# Patient Record
Sex: MALE | Race: WHITE | NOT HISPANIC OR LATINO
[De-identification: names, ages, dates, MRNs, and addresses within clinical notes are randomized per-mention and may not be internally consistent; named-entity substitution may affect disease eponyms.]

---

## 2018-01-26 PROBLEM — Z00.00 ENCOUNTER FOR PREVENTIVE HEALTH EXAMINATION: Status: ACTIVE | Noted: 2018-01-26

## 2018-04-11 PROBLEM — Z00.00 ENCOUNTER FOR PREVENTIVE HEALTH EXAMINATION: Noted: 2018-04-11

## 2018-06-11 ENCOUNTER — APPOINTMENT (OUTPATIENT)
Dept: NEUROSURGERY | Facility: CLINIC | Age: 68
End: 2018-06-11
Payer: MEDICARE

## 2018-06-11 VITALS
SYSTOLIC BLOOD PRESSURE: 122 MMHG | WEIGHT: 190 LBS | TEMPERATURE: 97.9 F | OXYGEN SATURATION: 96 % | RESPIRATION RATE: 16 BRPM | DIASTOLIC BLOOD PRESSURE: 74 MMHG | HEART RATE: 55 BPM | BODY MASS INDEX: 22.43 KG/M2 | HEIGHT: 77 IN

## 2018-06-11 DIAGNOSIS — Z86.79 PERSONAL HISTORY OF OTHER DISEASES OF THE CIRCULATORY SYSTEM: ICD-10-CM

## 2018-06-11 DIAGNOSIS — S42.009A FRACTURE OF UNSPECIFIED PART OF UNSPECIFIED CLAVICLE, INITIAL ENCOUNTER FOR CLOSED FRACTURE: ICD-10-CM

## 2018-06-11 DIAGNOSIS — M19.90 UNSPECIFIED OSTEOARTHRITIS, UNSPECIFIED SITE: ICD-10-CM

## 2018-06-11 DIAGNOSIS — Z87.39 PERSONAL HISTORY OF OTHER DISEASES OF THE MUSCULOSKELETAL SYSTEM AND CONNECTIVE TISSUE: ICD-10-CM

## 2018-06-11 DIAGNOSIS — G47.30 SLEEP APNEA, UNSPECIFIED: ICD-10-CM

## 2018-06-11 DIAGNOSIS — Z86.69 PERSONAL HISTORY OF OTHER DISEASES OF THE NERVOUS SYSTEM AND SENSE ORGANS: ICD-10-CM

## 2018-06-11 DIAGNOSIS — Z86.59 PERSONAL HISTORY OF OTHER MENTAL AND BEHAVIORAL DISORDERS: ICD-10-CM

## 2018-06-11 DIAGNOSIS — F32.9 MAJOR DEPRESSIVE DISORDER, SINGLE EPISODE, UNSPECIFIED: ICD-10-CM

## 2018-06-11 DIAGNOSIS — Z78.9 OTHER SPECIFIED HEALTH STATUS: ICD-10-CM

## 2018-06-11 DIAGNOSIS — Z87.01 PERSONAL HISTORY OF PNEUMONIA (RECURRENT): ICD-10-CM

## 2018-06-11 PROCEDURE — 99205 OFFICE O/P NEW HI 60 MIN: CPT

## 2018-06-12 PROBLEM — Z86.79 HISTORY OF HYPERTENSION: Status: RESOLVED | Noted: 2018-06-11 | Resolved: 2018-06-12

## 2018-06-12 PROBLEM — Z87.39 HISTORY OF ARTHRITIS: Status: RESOLVED | Noted: 2018-06-11 | Resolved: 2018-06-12

## 2018-06-12 PROBLEM — S42.009A COLLAR BONE FRACTURE: Status: ACTIVE | Noted: 2018-06-12

## 2018-06-12 PROBLEM — F32.9 DEPRESSION: Status: ACTIVE | Noted: 2018-06-12

## 2018-06-12 PROBLEM — Z86.59 HISTORY OF DEPRESSION: Status: RESOLVED | Noted: 2018-06-11 | Resolved: 2018-06-12

## 2018-06-12 PROBLEM — G47.30 SLEEP APNEA: Status: ACTIVE | Noted: 2018-06-12

## 2018-06-12 PROBLEM — M19.90 ARTHRITIS: Status: ACTIVE | Noted: 2018-06-12

## 2018-06-12 PROBLEM — Z86.69 HISTORY OF SLEEP APNEA: Status: RESOLVED | Noted: 2018-06-11 | Resolved: 2018-06-12

## 2018-06-12 PROBLEM — Z78.9 DOES NOT USE ILLICIT DRUGS: Status: ACTIVE | Noted: 2018-06-11

## 2018-06-12 PROBLEM — Z87.01 HISTORY OF PNEUMONIA: Status: RESOLVED | Noted: 2018-06-11 | Resolved: 2018-06-12

## 2018-06-12 RX ORDER — QUETIAPINE FUMARATE 25 MG/1
25 TABLET ORAL
Refills: 0 | Status: ACTIVE | COMMUNITY

## 2018-06-12 RX ORDER — MONTELUKAST 10 MG/1
10 TABLET, FILM COATED ORAL
Refills: 0 | Status: ACTIVE | COMMUNITY

## 2018-06-18 ENCOUNTER — APPOINTMENT (OUTPATIENT)
Dept: OPHTHALMOLOGY | Facility: CLINIC | Age: 68
End: 2018-06-18
Payer: COMMERCIAL

## 2018-06-18 PROCEDURE — 92225: CPT | Mod: RT

## 2018-06-18 PROCEDURE — 92004 COMPRE OPH EXAM NEW PT 1/>: CPT

## 2018-07-13 ENCOUNTER — APPOINTMENT (OUTPATIENT)
Dept: PULMONOLOGY | Facility: CLINIC | Age: 68
End: 2018-07-13
Payer: MEDICARE

## 2018-07-13 VITALS
RESPIRATION RATE: 16 BRPM | WEIGHT: 186 LBS | OXYGEN SATURATION: 97 % | TEMPERATURE: 98 F | DIASTOLIC BLOOD PRESSURE: 78 MMHG | SYSTOLIC BLOOD PRESSURE: 110 MMHG | HEIGHT: 77 IN | HEART RATE: 90 BPM | BODY MASS INDEX: 21.96 KG/M2

## 2018-07-13 PROCEDURE — 99204 OFFICE O/P NEW MOD 45 MIN: CPT

## 2018-07-19 ENCOUNTER — APPOINTMENT (OUTPATIENT)
Dept: OPHTHALMOLOGY | Facility: CLINIC | Age: 68
End: 2018-07-19
Payer: MEDICARE

## 2018-07-19 PROCEDURE — ZZZZZ: CPT

## 2018-07-19 PROCEDURE — 66821 AFTER CATARACT LASER SURGERY: CPT | Mod: LT

## 2018-08-08 ENCOUNTER — APPOINTMENT (OUTPATIENT)
Dept: ORTHOPEDIC SURGERY | Facility: CLINIC | Age: 68
End: 2018-08-08

## 2018-08-13 ENCOUNTER — APPOINTMENT (OUTPATIENT)
Dept: NEUROLOGY | Facility: CLINIC | Age: 68
End: 2018-08-13
Payer: MEDICARE

## 2018-08-13 VITALS
BODY MASS INDEX: 21.96 KG/M2 | HEART RATE: 95 BPM | OXYGEN SATURATION: 98 % | HEIGHT: 77 IN | TEMPERATURE: 98.2 F | WEIGHT: 186 LBS | DIASTOLIC BLOOD PRESSURE: 78 MMHG | SYSTOLIC BLOOD PRESSURE: 118 MMHG

## 2018-08-13 DIAGNOSIS — Z82.0 FAMILY HISTORY OF EPILEPSY AND OTHER DISEASES OF THE NERVOUS SYSTEM: ICD-10-CM

## 2018-08-13 DIAGNOSIS — Z87.891 PERSONAL HISTORY OF NICOTINE DEPENDENCE: ICD-10-CM

## 2018-08-13 DIAGNOSIS — M75.82 OTHER SHOULDER LESIONS, LEFT SHOULDER: ICD-10-CM

## 2018-08-13 PROCEDURE — 99204 OFFICE O/P NEW MOD 45 MIN: CPT

## 2018-08-20 ENCOUNTER — APPOINTMENT (OUTPATIENT)
Dept: PULMONOLOGY | Facility: CLINIC | Age: 68
End: 2018-08-20
Payer: MEDICARE

## 2018-08-20 VITALS
TEMPERATURE: 97.8 F | WEIGHT: 196 LBS | HEART RATE: 78 BPM | OXYGEN SATURATION: 97 % | BODY MASS INDEX: 23.24 KG/M2 | DIASTOLIC BLOOD PRESSURE: 78 MMHG | SYSTOLIC BLOOD PRESSURE: 114 MMHG

## 2018-08-20 PROCEDURE — 99214 OFFICE O/P EST MOD 30 MIN: CPT

## 2018-09-12 ENCOUNTER — APPOINTMENT (OUTPATIENT)
Dept: NEUROLOGY | Facility: CLINIC | Age: 68
End: 2018-09-12

## 2018-10-12 ENCOUNTER — APPOINTMENT (OUTPATIENT)
Dept: NEUROLOGY | Facility: CLINIC | Age: 68
End: 2018-10-12
Payer: MEDICARE

## 2018-10-12 VITALS — OXYGEN SATURATION: 97 % | DIASTOLIC BLOOD PRESSURE: 65 MMHG | HEART RATE: 79 BPM | SYSTOLIC BLOOD PRESSURE: 92 MMHG

## 2018-10-12 DIAGNOSIS — Z87.820 PERSONAL HISTORY OF TRAUMATIC BRAIN INJURY: ICD-10-CM

## 2018-10-12 PROCEDURE — 99213 OFFICE O/P EST LOW 20 MIN: CPT

## 2018-10-29 ENCOUNTER — APPOINTMENT (OUTPATIENT)
Dept: PULMONOLOGY | Facility: CLINIC | Age: 68
End: 2018-10-29
Payer: MEDICARE

## 2018-10-29 VITALS — HEART RATE: 71 BPM | RESPIRATION RATE: 18 BRPM | OXYGEN SATURATION: 94 %

## 2018-10-29 VITALS
HEART RATE: 121 BPM | RESPIRATION RATE: 12 BRPM | DIASTOLIC BLOOD PRESSURE: 70 MMHG | OXYGEN SATURATION: 91 % | SYSTOLIC BLOOD PRESSURE: 112 MMHG | TEMPERATURE: 97.9 F

## 2018-10-29 PROCEDURE — 99214 OFFICE O/P EST MOD 30 MIN: CPT

## 2018-11-16 ENCOUNTER — RX RENEWAL (OUTPATIENT)
Age: 68
End: 2018-11-16

## 2019-01-04 ENCOUNTER — APPOINTMENT (OUTPATIENT)
Dept: PULMONOLOGY | Facility: CLINIC | Age: 69
End: 2019-01-04
Payer: MEDICARE

## 2019-01-04 VITALS
RESPIRATION RATE: 14 BRPM | OXYGEN SATURATION: 98 % | WEIGHT: 196 LBS | HEART RATE: 76 BPM | BODY MASS INDEX: 23.24 KG/M2 | DIASTOLIC BLOOD PRESSURE: 80 MMHG | SYSTOLIC BLOOD PRESSURE: 112 MMHG

## 2019-01-04 PROCEDURE — 99215 OFFICE O/P EST HI 40 MIN: CPT | Mod: 25

## 2019-01-04 PROCEDURE — 94060 EVALUATION OF WHEEZING: CPT

## 2019-01-04 RX ORDER — VALPROIC ACID 250 MG/1
250 CAPSULE, LIQUID FILLED ORAL
Refills: 0 | Status: COMPLETED | COMMUNITY
End: 2019-01-04

## 2019-01-04 RX ORDER — APIXABAN 5 MG/1
5 TABLET, FILM COATED ORAL
Refills: 0 | Status: ACTIVE | COMMUNITY

## 2019-03-01 ENCOUNTER — APPOINTMENT (OUTPATIENT)
Dept: NEUROLOGY | Facility: CLINIC | Age: 69
End: 2019-03-01
Payer: MEDICARE

## 2019-03-01 VITALS
SYSTOLIC BLOOD PRESSURE: 104 MMHG | HEART RATE: 73 BPM | BODY MASS INDEX: 22.91 KG/M2 | HEIGHT: 77 IN | DIASTOLIC BLOOD PRESSURE: 66 MMHG | WEIGHT: 194 LBS | OXYGEN SATURATION: 97 %

## 2019-03-01 DIAGNOSIS — R41.89 OTHER SYMPTOMS AND SIGNS INVOLVING COGNITIVE FUNCTIONS AND AWARENESS: ICD-10-CM

## 2019-03-01 DIAGNOSIS — R46.89 OTHER SYMPTOMS AND SIGNS INVOLVING COGNITIVE FUNCTIONS AND AWARENESS: ICD-10-CM

## 2019-03-01 PROCEDURE — 99213 OFFICE O/P EST LOW 20 MIN: CPT

## 2019-03-19 ENCOUNTER — RX RENEWAL (OUTPATIENT)
Age: 69
End: 2019-03-19

## 2019-05-01 ENCOUNTER — APPOINTMENT (OUTPATIENT)
Dept: PULMONOLOGY | Facility: CLINIC | Age: 69
End: 2019-05-01
Payer: MEDICARE

## 2019-05-01 VITALS
OXYGEN SATURATION: 97 % | DIASTOLIC BLOOD PRESSURE: 70 MMHG | SYSTOLIC BLOOD PRESSURE: 100 MMHG | HEART RATE: 91 BPM | BODY MASS INDEX: 23.36 KG/M2 | WEIGHT: 197 LBS

## 2019-05-01 PROCEDURE — 99214 OFFICE O/P EST MOD 30 MIN: CPT

## 2019-05-01 RX ORDER — ASPIRIN ENTERIC COATED TABLETS 81 MG 81 MG/1
81 TABLET, DELAYED RELEASE ORAL
Refills: 0 | Status: COMPLETED | COMMUNITY
End: 2019-05-01

## 2019-05-01 NOTE — DISCUSSION/SUMMARY
[FreeTextEntry1] : He is a 68 year-old man with a history of mild COPD. He had a left thoracotomy in the remote past. It is not clear specifically what procedure was done, possibly a pleurodesis after a pneumothorax. He also has a history of traumatic brain injury. He stopped smoking over 30 years ago. \par \par His COPD he is not active at the present time. He was advised to use a nebulizer as needed. He is unable to use a metered-dose inhaler. \par \par I will see him again in 6 months. Sooner if necessary.

## 2019-05-01 NOTE — PROCEDURE
[FreeTextEntry1] : Spirometry was performed January 4, 2019. No obstructive airways disease was present. No significant change after inhalation of a bronchodilator.\par \par A chest radiograph was performed on November 3, 2018. A retrocardiac opacity was present. Possibly due to a small left pleural effusion. Of note is that he is status post a left thoracotomy in the remote past.\par \par

## 2019-05-01 NOTE — HISTORY OF PRESENT ILLNESS
[FreeTextEntry1] : He is feeling well. On Robitussin as needed for a cough. Uses nebulizer one to two times per day. No c/o wheezing shortness of breath.

## 2019-05-01 NOTE — PHYSICAL EXAM
[General Appearance - In No Acute Distress] : no acute distress [Neck Appearance] : the appearance of the neck was normal [Heart Sounds] : normal S1 and S2 [Neck Cervical Mass (___cm)] : no neck mass was observed [Auscultation Breath Sounds / Voice Sounds] : lungs were clear to auscultation bilaterally [Bowel Sounds] : normal bowel sounds [Nail Clubbing] : no clubbing of the fingernails [Abdomen Soft] : soft [Cyanosis, Localized] : no localized cyanosis [] : no rash [No Focal Deficits] : no focal deficits [Oriented To Time, Place, And Person] : oriented to person, place, and time [Erythema] : no erythema of the pharynx [FreeTextEntry1] : Left thoracotomy scar.

## 2019-05-01 NOTE — REVIEW OF SYSTEMS
[Hypertension] : ~T hypertension [Trauma] : ~T physical trauma [Seizures] : seizures [Memory Loss] : ~T memory lapses or loss [Fever] : no fever [Nasal Congestion] : no nasal congestion [Sinus Problems] : no sinus problems [Cough] : cough [Postnasal Drip] : no postnasal drip [Sputum] : not coughing up ~M sputum [Hemoptysis] : no hemoptysis [Dyspnea] : no dyspnea [Wheezing] : no wheezing [Pleuritic Pain] : no pleuritic pain [Chest Tightness] : no chest tightness [Nasal Discharge] : no nasal discharge [Chest Discomfort] : no chest discomfort [Heartburn] : no heartburn [Reflux] : no reflux [Nocturia] : no nocturia [Back Pain] : ~T no back pain [Rash] : no [unfilled] rash [Anemia] : no anemia [Headache] : no headache [Depression] : no depression [Diabetes] : no diabetes mellitus [Difficulty Initiating Sleep] : no difficulty falling asleep [DVT] : no DVT [Snoring] : no snoring

## 2019-07-12 ENCOUNTER — APPOINTMENT (OUTPATIENT)
Dept: PULMONOLOGY | Facility: CLINIC | Age: 69
End: 2019-07-12
Payer: MEDICARE

## 2019-07-12 VITALS
WEIGHT: 206 LBS | SYSTOLIC BLOOD PRESSURE: 110 MMHG | HEART RATE: 85 BPM | OXYGEN SATURATION: 96 % | HEIGHT: 72 IN | BODY MASS INDEX: 27.9 KG/M2 | DIASTOLIC BLOOD PRESSURE: 69 MMHG | RESPIRATION RATE: 14 BRPM

## 2019-07-12 DIAGNOSIS — J44.9 CHRONIC OBSTRUCTIVE PULMONARY DISEASE, UNSPECIFIED: ICD-10-CM

## 2019-07-12 PROCEDURE — 99214 OFFICE O/P EST MOD 30 MIN: CPT

## 2019-07-12 NOTE — DISCUSSION/SUMMARY
[FreeTextEntry1] : He is a 68 year-old man with a history of mild COPD. He had a left thoracotomy in the remote past. It is not clear specifically what procedure was done. It appears he had a pleurodesis after a pneumothorax. He also has a history of traumatic brain injury. He stopped smoking over 30 years ago. \par \par Has been coughing more lately. For about one month. Has trouble in producing mucus. He was given a trial of budesonide. To add this to albuterol twice a day. \par \par Follow up in three months. Sooner if needed.

## 2019-07-12 NOTE — PHYSICAL EXAM
[General Appearance - In No Acute Distress] : no acute distress [Neck Cervical Mass (___cm)] : no neck mass was observed [Neck Appearance] : the appearance of the neck was normal [Auscultation Breath Sounds / Voice Sounds] : lungs were clear to auscultation bilaterally [Heart Sounds] : normal S1 and S2 [Bowel Sounds] : normal bowel sounds [Nail Clubbing] : no clubbing of the fingernails [Cyanosis, Localized] : no localized cyanosis [No Focal Deficits] : no focal deficits [Oriented To Time, Place, And Person] : oriented to person, place, and time [Impaired Insight] : insight and judgment were intact [] : no hepato-splenomegaly [Erythema] : no erythema of the pharynx [FreeTextEntry1] : Left thoracotomy scar.

## 2019-07-12 NOTE — HISTORY OF PRESENT ILLNESS
[FreeTextEntry1] : He said he has been coughing more. He has been feeling congested. Some mild difficulty in producing mucus. No wheezing or shortness of breath.No fever, chills or sweats. No chest pain. He has been more active. He has been taking dancing lessons.

## 2019-07-12 NOTE — REVIEW OF SYSTEMS
[Cough] : cough [Hypertension] : ~T hypertension [Trauma] : ~T physical trauma [Memory Loss] : ~T memory lapses or loss [Seizures] : seizures [Fever] : no fever [Fatigue] : no fatigue [Nasal Congestion] : no nasal congestion [Postnasal Drip] : no postnasal drip [Sinus Problems] : no sinus problems [Sputum] : not coughing up ~M sputum [Hemoptysis] : no hemoptysis [Dyspnea] : no dyspnea [Wheezing] : no wheezing [Chest Discomfort] : no chest discomfort [Nasal Discharge] : no nasal discharge [Heartburn] : no heartburn [Reflux] : no reflux [Nocturia] : no nocturia [Back Pain] : ~T no back pain [Rash] : no [unfilled] rash [Headache] : no headache [Anemia] : no anemia [Depression] : no depression [Diabetes] : no diabetes mellitus [DVT] : no DVT [Difficulty Initiating Sleep] : no difficulty falling asleep [Snoring] : no snoring

## 2019-08-13 ENCOUNTER — APPOINTMENT (OUTPATIENT)
Dept: NEUROLOGY | Facility: CLINIC | Age: 69
End: 2019-08-13

## 2019-10-10 NOTE — HISTORY OF PRESENT ILLNESS
[de-identified] : KWESI DOVER is a 68 year old male who present in the office for initial consultation with a CC of "_". Patient was referred by MD___\travis B/L breast pain

## 2019-10-16 ENCOUNTER — APPOINTMENT (OUTPATIENT)
Dept: SURGERY | Facility: CLINIC | Age: 69
End: 2019-10-16

## 2019-10-21 ENCOUNTER — APPOINTMENT (OUTPATIENT)
Dept: SURGERY | Facility: CLINIC | Age: 69
End: 2019-10-21

## 2019-11-22 ENCOUNTER — APPOINTMENT (OUTPATIENT)
Dept: PULMONOLOGY | Facility: CLINIC | Age: 69
End: 2019-11-22
Payer: MEDICARE

## 2019-11-22 VITALS
HEART RATE: 79 BPM | DIASTOLIC BLOOD PRESSURE: 70 MMHG | RESPIRATION RATE: 16 BRPM | OXYGEN SATURATION: 97 % | SYSTOLIC BLOOD PRESSURE: 102 MMHG

## 2019-11-22 PROCEDURE — 99214 OFFICE O/P EST MOD 30 MIN: CPT

## 2019-11-22 NOTE — PHYSICAL EXAM
[General Appearance - In No Acute Distress] : no acute distress [Neck Appearance] : the appearance of the neck was normal [Neck Cervical Mass (___cm)] : no neck mass was observed [Heart Sounds] : normal S1 and S2 [Auscultation Breath Sounds / Voice Sounds] : lungs were clear to auscultation bilaterally [Bowel Sounds] : normal bowel sounds [Cyanosis, Localized] : no localized cyanosis [] : no rash [No Focal Deficits] : no focal deficits [Oriented To Time, Place, And Person] : oriented to person, place, and time [Impaired Insight] : insight and judgment were intact [General Appearance - Well Developed] : well developed [Erythema] : no erythema of the pharynx [FreeTextEntry1] : Left thoracotomy scar.

## 2019-11-22 NOTE — HISTORY OF PRESENT ILLNESS
[FreeTextEntry1] : He has been feeling more short of breath at times. Has been using the nebulizer twice a day. No fever or chills. No chest pain.

## 2019-11-22 NOTE — REVIEW OF SYSTEMS
[Cough] : cough [Hypertension] : ~T hypertension [Trauma] : ~T physical trauma [Seizures] : seizures [Memory Loss] : ~T memory lapses or loss [Fever] : no fever [Fatigue] : no fatigue [Nasal Congestion] : no nasal congestion [Postnasal Drip] : no postnasal drip [Sinus Problems] : no sinus problems [Sputum] : not coughing up ~M sputum [Dyspnea] : no dyspnea [Wheezing] : no wheezing [Chest Discomfort] : no chest discomfort [Nasal Discharge] : no nasal discharge [Heartburn] : no heartburn [Reflux] : no reflux [Nocturia] : no nocturia [Back Pain] : ~T no back pain [Rash] : no [unfilled] rash [Anemia] : no anemia [Headache] : no headache [Depression] : no depression [Diabetes] : no diabetes mellitus [DVT] : no DVT [Difficulty Initiating Sleep] : no difficulty falling asleep [Snoring] : no snoring

## 2019-11-22 NOTE — DISCUSSION/SUMMARY
[FreeTextEntry1] : He is a 69 year-old man with a history of mild COPD. He had a left thoracotomy in the remote past. It is not clear specifically what procedure was done. It appears he had a pleurodesis after a pneumothorax. He also has a history of traumatic brain injury. He stopped smoking over 30 years ago. \par \par He has been coughing more lately he said. Has difficulty in producing mucus. He was advised to use his nebulizer regularly. This should help produce mucus.\par \par A followup chest radiograph was requested.\par \par Follow up in six months.

## 2022-05-06 ENCOUNTER — APPOINTMENT (OUTPATIENT)
Dept: ELECTROPHYSIOLOGY | Facility: CLINIC | Age: 72
End: 2022-05-06
Payer: MEDICARE

## 2022-05-06 VITALS
SYSTOLIC BLOOD PRESSURE: 108 MMHG | OXYGEN SATURATION: 95 % | DIASTOLIC BLOOD PRESSURE: 74 MMHG | HEART RATE: 83 BPM | TEMPERATURE: 97.9 F | BODY MASS INDEX: 30.79 KG/M2 | HEIGHT: 72 IN

## 2022-05-06 VITALS — WEIGHT: 227 LBS | BODY MASS INDEX: 30.79 KG/M2

## 2022-05-06 DIAGNOSIS — E78.5 HYPERLIPIDEMIA, UNSPECIFIED: ICD-10-CM

## 2022-05-06 DIAGNOSIS — I48.19 OTHER PERSISTENT ATRIAL FIBRILLATION: ICD-10-CM

## 2022-05-06 DIAGNOSIS — I10 ESSENTIAL (PRIMARY) HYPERTENSION: ICD-10-CM

## 2022-05-06 PROCEDURE — 99204 OFFICE O/P NEW MOD 45 MIN: CPT

## 2022-05-06 PROCEDURE — 93000 ELECTROCARDIOGRAM COMPLETE: CPT

## 2022-05-06 RX ORDER — SACUBITRIL AND VALSARTAN 49; 51 MG/1; MG/1
49-51 TABLET, FILM COATED ORAL TWICE DAILY
Refills: 0 | Status: ACTIVE | COMMUNITY
Start: 2022-05-06

## 2022-05-06 RX ORDER — METOPROLOL SUCCINATE 200 MG/1
200 TABLET, EXTENDED RELEASE ORAL DAILY
Qty: 45 | Refills: 3 | Status: ACTIVE | COMMUNITY
Start: 1900-01-01 | End: 1900-01-01

## 2022-05-06 RX ORDER — LOSARTAN POTASSIUM 50 MG/1
50 TABLET, FILM COATED ORAL
Refills: 0 | Status: DISCONTINUED | COMMUNITY
End: 2022-05-06

## 2022-05-06 RX ORDER — FUROSEMIDE 40 MG/1
40 TABLET ORAL
Refills: 0 | Status: DISCONTINUED | COMMUNITY
End: 2022-05-06

## 2022-05-06 RX ORDER — ATORVASTATIN CALCIUM 40 MG/1
40 TABLET, FILM COATED ORAL DAILY
Refills: 0 | Status: ACTIVE | COMMUNITY
Start: 2022-05-06

## 2022-05-06 NOTE — HISTORY OF PRESENT ILLNESS
[FreeTextEntry1] : Manuel Russell is a 71 year old male with PMH of [place], persistent AFIB (2018), NICM, COPD s/p left lung lobectomy in 1990s; presenting today for initial evaluation. Recently saw Cardiologist on 4/25/22 for palpitations, lightheadedness and dizziness with exertion. He underwent 4 day holter monitor revealing AFIB. His most recent stress test reveals an EF of 39%. Admits to chest pressure (7/10) up to 5 times per week but believes it to be related to his COPD diagnosis. On Eliquis for thromboembolic prophylaxis with no bleeding issues.

## 2022-05-06 NOTE — DISCUSSION/SUMMARY
[FreeTextEntry1] : Impression: \par \par 1. Afib: EKG performed in office today to assess for AFIB and reveals AFIB. Discussed treatment options for afib including rate control vs antiarrhythmics vs DCCV vs possible ablation. Given recurrent symptomatic afib despite rate control management and young age/preference to avoid antiarrhythmics, recommend undergoing possible afib ablation. Encouraged avoiding AFIB triggers such as alcohol, caffeine, dehydration, and stress. Recommend increasing Metoprolol to 300mg daily, if still symptomatic may consider increasing to 400mg in future. If still symptomatic with 400mg Metoprolol, will consider adding Digoxin to medication regimen. \par \par 2. HTN: Resume oral antihypertensives as prescribed. Encouraged heart healthy diet, sodium restriction, and weight loss. Continue regular follow ups with Cardiologist for further HTN management.\par \par 3. HLD: Resume statin therapy as prescribed and regular follow up with Cardiologist for routine lipid monitoring and management.\par \par Plan for metoprolol and if necessary digoxin. \par \par Sincerely,\par \par Primitivo Snow MD

## 2023-07-25 ENCOUNTER — APPOINTMENT (OUTPATIENT)
Dept: PULMONOLOGY | Facility: CLINIC | Age: 73
End: 2023-07-25
Payer: MEDICARE

## 2023-07-25 VITALS
TEMPERATURE: 97.7 F | OXYGEN SATURATION: 97 % | WEIGHT: 218 LBS | SYSTOLIC BLOOD PRESSURE: 110 MMHG | DIASTOLIC BLOOD PRESSURE: 79 MMHG | HEIGHT: 72 IN | BODY MASS INDEX: 29.53 KG/M2 | HEART RATE: 89 BPM

## 2023-07-25 PROCEDURE — 99204 OFFICE O/P NEW MOD 45 MIN: CPT

## 2023-07-27 NOTE — PROCEDURE
[FreeTextEntry1] : previous data reviewed:\par \par [facility] xray chest 2017- hyperinflated, hiatal hernia\par [facility] ct a/p 2023- GGO at lung bases/ atelctasis lingula; large diaphgramtic hernia\par [facility] xray 2018- hyperinflated lung; retrocardiac opacity\par

## 2023-07-27 NOTE — HISTORY OF PRESENT ILLNESS
[< 20 pack-years] : < 20 pack-years [TextBox_4] : KWESI DOVER is a 72 year old male who presents with his wife for copd/ cough eval\par \par 73 yo male , former smoker, quit > 30 years ago, history of TBI/ cogniitive impairment,  [facility] History, afib on NOAC\par poor historian\par some history from wife\par \par some coughing wheezing\par some use of nebulizer\par no obvious coughing with food/drinks\par \par  [YearQuit] : 1990

## 2023-09-20 ENCOUNTER — APPOINTMENT (OUTPATIENT)
Dept: PULMONOLOGY | Facility: CLINIC | Age: 73
End: 2023-09-20
Payer: MEDICARE

## 2023-09-20 VITALS — DIASTOLIC BLOOD PRESSURE: 64 MMHG | SYSTOLIC BLOOD PRESSURE: 92 MMHG

## 2023-09-20 VITALS — OXYGEN SATURATION: 98 % | HEART RATE: 88 BPM | RESPIRATION RATE: 16 BRPM

## 2023-09-20 DIAGNOSIS — R05.9 COUGH, UNSPECIFIED: ICD-10-CM

## 2023-09-20 PROCEDURE — 99214 OFFICE O/P EST MOD 30 MIN: CPT

## 2024-04-25 ENCOUNTER — APPOINTMENT (OUTPATIENT)
Dept: PULMONOLOGY | Facility: CLINIC | Age: 74
End: 2024-04-25
Payer: MEDICARE

## 2024-04-25 VITALS
HEART RATE: 84 BPM | OXYGEN SATURATION: 97 % | BODY MASS INDEX: 29.66 KG/M2 | DIASTOLIC BLOOD PRESSURE: 66 MMHG | WEIGHT: 219 LBS | TEMPERATURE: 97.7 F | HEIGHT: 72 IN | SYSTOLIC BLOOD PRESSURE: 111 MMHG

## 2024-04-25 DIAGNOSIS — R93.89 ABNORMAL FINDINGS ON DIAGNOSTIC IMAGING OF OTHER SPECIFIED BODY STRUCTURES: ICD-10-CM

## 2024-04-25 PROCEDURE — 99214 OFFICE O/P EST MOD 30 MIN: CPT

## 2024-04-25 NOTE — PHYSICAL EXAM
[No Acute Distress] : no acute distress [No Resp Distress] : no resp distress [No Acc Muscle Use] : no acc muscle use [Clear to Auscultation Bilaterally] : clear to auscultation bilaterally [Kyphosis] : kyphosis [TextBox_132] : ambulates with cane  [TextBox_140] : orientated to person/place; poor historian

## 2024-04-25 NOTE — PROCEDURE
[FreeTextEntry1] : [facility] xray 8/2023 reviewed  previous data reviewed:  [facility] xray chest 2017- hyperinflated, hiatal hernia [facility] ct a/p 2023- GGO at lung bases/ atelectasis lingula; large diaphragmatic hernia [facility] xray 2018- hyperinflated lung; retrocardiac opacity

## 2024-04-25 NOTE — HISTORY OF PRESENT ILLNESS
[Former] : former [TextBox_4] : KWESI DOVER is a 72 year old male who presents with his wife for copd/ cough f/u  former smoker, quit > 30 years ago, history of TBI/ cogniitive impairment, [facility] History, afib on NOAC poor historian some history from wife- states he only takes albuterol nebs  had xray done at [facility] and CT chest in 2023    coughign better on neb + acetylcysteine nebs   .   Smoking Status: < 20 pack-years   Year quit: 1990

## 2024-06-17 ENCOUNTER — APPOINTMENT (OUTPATIENT)
Dept: PULMONOLOGY | Facility: CLINIC | Age: 74
End: 2024-06-17
Payer: MEDICARE

## 2024-06-17 VITALS
SYSTOLIC BLOOD PRESSURE: 98 MMHG | TEMPERATURE: 97.3 F | OXYGEN SATURATION: 94 % | DIASTOLIC BLOOD PRESSURE: 68 MMHG | WEIGHT: 220 LBS | BODY MASS INDEX: 29.84 KG/M2 | HEART RATE: 97 BPM

## 2024-06-17 DIAGNOSIS — J44.9 CHRONIC OBSTRUCTIVE PULMONARY DISEASE, UNSPECIFIED: ICD-10-CM

## 2024-06-17 PROCEDURE — 99214 OFFICE O/P EST MOD 30 MIN: CPT

## 2024-06-17 RX ORDER — BUDESONIDE 0.25 MG/2ML
0.25 INHALANT ORAL TWICE DAILY
Qty: 6 | Refills: 2 | Status: COMPLETED | COMMUNITY
Start: 2019-07-12 | End: 2024-06-17

## 2024-06-17 NOTE — HISTORY OF PRESENT ILLNESS
[Former] : former [TextBox_4] : He remains on the nebulizer.  He uses albuterol and acetylcysteine twice a day.  He denied any cough, wheezing or shortness of breath.  No chest pain.  No constitutional symptoms. [YearQuit] : 1990

## 2024-06-17 NOTE — REVIEW OF SYSTEMS
[Hypertension] : ~T hypertension [Trauma] : ~T physical trauma [Seizures] : seizures [Memory Loss] : ~T memory lapses or loss [Fever] : no fever [Chills] : no chills [Nasal Congestion] : no nasal congestion [Cough] : no cough [Sputum] : not coughing up ~M sputum [Hemoptysis] : no hemoptysis [Dyspnea] : no dyspnea [Chest Tightness] : no chest tightness [Wheezing] : no wheezing [Chest Discomfort] : no chest discomfort [Nasal Discharge] : no nasal discharge [Heartburn] : no heartburn [Reflux] : no reflux [Nocturia] : no nocturia [Back Pain] : ~T no back pain [Rash] : no [unfilled] rash [Anemia] : no anemia [Headache] : no headache [Depression] : no depression [Diabetes] : no diabetes mellitus [DVT] : no DVT [Difficulty Initiating Sleep] : no difficulty falling asleep [Snoring] : no snoring

## 2024-06-17 NOTE — PHYSICAL EXAM
[General Appearance - In No Acute Distress] : no acute distress [General Appearance - Well Developed] : well developed [Neck Appearance] : the appearance of the neck was normal [Neck Cervical Mass (___cm)] : no neck mass was observed [Heart Rate And Rhythm] : heart rate and rhythm were normal [Heart Sounds] : normal S1 and S2 [Auscultation Breath Sounds / Voice Sounds] : lungs were clear to auscultation bilaterally [Bowel Sounds] : normal bowel sounds [Nail Clubbing] : no clubbing of the fingernails [Cyanosis, Localized] : no localized cyanosis [Skin Turgor] : normal skin turgor [] : no rash [No Focal Deficits] : no focal deficits [Oriented To Time, Place, And Person] : oriented to person, place, and time [Impaired Insight] : insight and judgment were intact [FreeTextEntry1] : Left thoracotomy scar.

## 2024-06-17 NOTE — PROCEDURE
[FreeTextEntry1] : PFT 1/4/19: No obstructive airways disease was present. No significant change after inhalation of a bronchodilator.  Chest x-ray 11/3/18: A retrocardiac opacity was present. Possibly due to a small left pleural effusion. Of note is that he is status post a left thoracotomy in the remote past.

## 2024-06-17 NOTE — DISCUSSION/SUMMARY
[FreeTextEntry1] : Impression COPD Former smoker Chronic cough with mucus production Status post left thoracotomy TBI  Recommend Continue with albuterol and acetylcysteine via nebulizer Obtain recent chest radiograph reports -Apparently he had recent films done Follow-up in 6 months, sooner if needed

## 2024-09-30 ENCOUNTER — OFFICE (OUTPATIENT)
Dept: URBAN - METROPOLITAN AREA CLINIC 109 | Facility: CLINIC | Age: 74
Setting detail: OPHTHALMOLOGY
End: 2024-09-30
Payer: MEDICARE

## 2024-09-30 DIAGNOSIS — H40.1132: ICD-10-CM

## 2024-09-30 DIAGNOSIS — H52.4: ICD-10-CM

## 2024-09-30 PROCEDURE — 92133 CPTRZD OPH DX IMG PST SGM ON: CPT | Performed by: OPHTHALMOLOGY

## 2024-09-30 PROCEDURE — 99204 OFFICE O/P NEW MOD 45 MIN: CPT | Performed by: OPHTHALMOLOGY

## 2024-09-30 PROCEDURE — 92015 DETERMINE REFRACTIVE STATE: CPT | Performed by: OPHTHALMOLOGY

## 2024-09-30 PROCEDURE — 92083 EXTENDED VISUAL FIELD XM: CPT | Performed by: OPHTHALMOLOGY

## 2024-09-30 PROCEDURE — 92020 GONIOSCOPY: CPT | Performed by: OPHTHALMOLOGY

## 2024-09-30 ASSESSMENT — CONFRONTATIONAL VISUAL FIELD TEST (CVF)
OD_FINDINGS: FULL
OS_FINDINGS: FULL

## 2024-12-16 ENCOUNTER — APPOINTMENT (OUTPATIENT)
Dept: PULMONOLOGY | Facility: CLINIC | Age: 74
End: 2024-12-16

## 2025-01-06 ENCOUNTER — OFFICE (OUTPATIENT)
Dept: URBAN - METROPOLITAN AREA CLINIC 109 | Facility: CLINIC | Age: 75
Setting detail: OPHTHALMOLOGY
End: 2025-01-06
Payer: MEDICARE

## 2025-01-06 DIAGNOSIS — H40.1132: ICD-10-CM

## 2025-01-06 PROCEDURE — 99213 OFFICE O/P EST LOW 20 MIN: CPT | Performed by: OPHTHALMOLOGY

## 2025-01-06 ASSESSMENT — REFRACTION_MANIFEST
OS_CYLINDER: -0.75
OS_CYLINDER: -1.00
OS_SPHERE: +2.25
OD_AXIS: 115
OD_CYLINDER: -1.25
OD_SPHERE: +2.75
OS_AXIS: 65
OD_VA1: 20/20
OS_VA1: 20/20
OD_ADD: +2.50
OS_AXIS: 024
OS_SPHERE: -0.50
OD_SPHERE: -1.00
OS_ADD: +2.50
OD_CYLINDER: SPH
OD_AXIS: 0

## 2025-01-06 ASSESSMENT — KERATOMETRY
OD_AXISANGLE_DEGREES: 070
OS_AXISANGLE_DEGREES: 103
OD_K2POWER_DIOPTERS: 46.25
OS_K2POWER_DIOPTERS: 46.50
OS_K1POWER_DIOPTERS: 45.00
OD_K1POWER_DIOPTERS: 45.00

## 2025-01-06 ASSESSMENT — REFRACTION_CURRENTRX
OS_AXIS: 24
OS_OVR_VA: 20/
OS_SPHERE: -0.50
OD_SPHERE: -1.00
OS_CYLINDER: -1.00
OD_OVR_VA: 20/

## 2025-01-06 ASSESSMENT — REFRACTION_AUTOREFRACTION
OS_AXIS: 024
OS_SPHERE: -0.75
OD_CYLINDER: -1.25
OD_AXIS: 152
OD_SPHERE: -1.25
OS_CYLINDER: -1.50

## 2025-01-06 ASSESSMENT — VISUAL ACUITY
OS_BCVA: 20/20
OD_BCVA: 20/20

## 2025-01-06 ASSESSMENT — TONOMETRY
OS_IOP_MMHG: 10
OD_IOP_MMHG: 11

## 2025-01-06 ASSESSMENT — CONFRONTATIONAL VISUAL FIELD TEST (CVF)
OS_FINDINGS: FULL
OD_FINDINGS: FULL

## 2025-01-06 ASSESSMENT — PACHYMETRY
OD_CT_UM: 467
OS_CT_CORRECTION: 5
OS_CT_UM: 472
OD_CT_CORRECTION: 6

## 2025-03-10 ENCOUNTER — APPOINTMENT (OUTPATIENT)
Dept: PULMONOLOGY | Facility: CLINIC | Age: 75
End: 2025-03-10
Payer: MEDICARE

## 2025-03-10 ENCOUNTER — NON-APPOINTMENT (OUTPATIENT)
Age: 75
End: 2025-03-10

## 2025-03-10 VITALS
SYSTOLIC BLOOD PRESSURE: 110 MMHG | TEMPERATURE: 98.3 F | HEART RATE: 98 BPM | WEIGHT: 210 LBS | BODY MASS INDEX: 28.48 KG/M2 | OXYGEN SATURATION: 95 % | DIASTOLIC BLOOD PRESSURE: 70 MMHG

## 2025-03-10 DIAGNOSIS — J44.9 CHRONIC OBSTRUCTIVE PULMONARY DISEASE, UNSPECIFIED: ICD-10-CM

## 2025-03-10 DIAGNOSIS — R05.9 COUGH, UNSPECIFIED: ICD-10-CM

## 2025-03-10 DIAGNOSIS — J40 BRONCHITIS, NOT SPECIFIED AS ACUTE OR CHRONIC: ICD-10-CM

## 2025-03-10 PROCEDURE — 99214 OFFICE O/P EST MOD 30 MIN: CPT

## 2025-03-10 RX ORDER — FLUTICASONE PROPIONATE AND SALMETEROL 250; 50 UG/1; UG/1
250-50 POWDER RESPIRATORY (INHALATION)
Qty: 1 | Refills: 1 | Status: ACTIVE | COMMUNITY
Start: 2025-03-10 | End: 1900-01-01